# Patient Record
Sex: FEMALE | Race: BLACK OR AFRICAN AMERICAN | NOT HISPANIC OR LATINO | Employment: OTHER | URBAN - METROPOLITAN AREA
[De-identification: names, ages, dates, MRNs, and addresses within clinical notes are randomized per-mention and may not be internally consistent; named-entity substitution may affect disease eponyms.]

---

## 2017-09-04 ENCOUNTER — HOSPITAL ENCOUNTER (EMERGENCY)
Facility: OTHER | Age: 57
Discharge: HOME | End: 2017-09-04
Attending: EMERGENCY MEDICINE
Payer: MEDICAID

## 2017-09-04 VITALS
RESPIRATION RATE: 18 BRPM | OXYGEN SATURATION: 98 % | WEIGHT: 285 LBS | BODY MASS INDEX: 44.73 KG/M2 | DIASTOLIC BLOOD PRESSURE: 78 MMHG | TEMPERATURE: 98 F | HEART RATE: 80 BPM | HEIGHT: 67 IN | SYSTOLIC BLOOD PRESSURE: 134 MMHG

## 2017-09-04 DIAGNOSIS — M25.432 PAIN AND SWELLING OF LEFT WRIST: ICD-10-CM

## 2017-09-04 DIAGNOSIS — M25.532 PAIN AND SWELLING OF LEFT WRIST: ICD-10-CM

## 2017-09-04 DIAGNOSIS — R05.9 COUGH: Primary | ICD-10-CM

## 2017-09-04 PROCEDURE — 25000003 PHARM REV CODE 250: Performed by: EMERGENCY MEDICINE

## 2017-09-04 PROCEDURE — 99283 EMERGENCY DEPT VISIT LOW MDM: CPT

## 2017-09-04 RX ORDER — ACETAMINOPHEN 500 MG
1000 TABLET ORAL
Status: COMPLETED | OUTPATIENT
Start: 2017-09-04 | End: 2017-09-04

## 2017-09-04 RX ORDER — IBUPROFEN 400 MG/1
400 TABLET ORAL
Status: COMPLETED | OUTPATIENT
Start: 2017-09-04 | End: 2017-09-04

## 2017-09-04 RX ORDER — IBUPROFEN 400 MG/1
400 TABLET ORAL 3 TIMES DAILY PRN
Qty: 20 TABLET | Refills: 0 | Status: SHIPPED | OUTPATIENT
Start: 2017-09-04

## 2017-09-04 RX ORDER — BENZONATATE 100 MG/1
100 CAPSULE ORAL 3 TIMES DAILY PRN
Qty: 20 CAPSULE | Refills: 0 | Status: SHIPPED | OUTPATIENT
Start: 2017-09-04 | End: 2017-09-14

## 2017-09-04 RX ADMIN — ACETAMINOPHEN 1000 MG: 500 TABLET ORAL at 07:09

## 2017-09-04 RX ADMIN — IBUPROFEN 400 MG: 400 TABLET, FILM COATED ORAL at 08:09

## 2017-09-05 NOTE — ED PROVIDER NOTES
"Encounter Date: 9/4/2017    SCRIBE #1 NOTE: I, Mary Sorto , am scribing for, and in the presence of, Dr. Perez.       History     Chief Complaint   Patient presents with    Cough     Patient c/o a productive cough with green and yellow phlegm for 2 weeks with subjective fever and sweats.  Denies CP and SOB    Wrist Pain     Patietn c/o left wrist pain and swelling for several days.  Patient stated she had surgery on it 9 years ago.  Denies recent trauma.      Time seen by provider: 7:20 PM    This is a 56 y.o. female who presents with complaint of cough that began two days ago. Cough with "green and yellow" sputum is described as constant. Pt reports intermittent diaphoresis and left wrist pain with associated swelling (began two days ago), but denies fever, chills, nausea, vomiting, abdominal pain, SOB, myalgias, or numbness. Pt denies recent injury, and underwent surgery on the left wrist nine years ago.       The history is provided by the patient.     Review of patient's allergies indicates:   Allergen Reactions    Contrast media Other (See Comments)     " I am allergic to the dye because I go into convulsions."      Past Medical History:   Diagnosis Date    Arthritis     CHF (congestive heart failure)     COPD (chronic obstructive pulmonary disease)     Diabetes mellitus     Gout     HLD (hyperlipidemia)     Hypertension     Thyroid disease      Past Surgical History:   Procedure Laterality Date    HAND SURGERY      right hand    HYSTERECTOMY      TOTAL THYROIDECTOMY      TUBAL LIGATION       History reviewed. No pertinent family history.  Social History   Substance Use Topics    Smoking status: Never Smoker    Smokeless tobacco: Never Used    Alcohol use No     Review of Systems   Constitutional: Positive for diaphoresis. Negative for chills and fever.   HENT: Negative for sore throat.    Respiratory: Positive for cough. Negative for shortness of breath.    Cardiovascular: Negative for " chest pain.   Gastrointestinal: Negative for abdominal pain, nausea and vomiting.   Genitourinary: Negative for dysuria.   Musculoskeletal: Negative for back pain and myalgias.        Positive for pain to the left wrist with associated swelling.   Skin: Negative for rash.   Neurological: Negative for weakness and numbness.   Hematological: Does not bruise/bleed easily.       Physical Exam     Initial Vitals [09/04/17 1912]   BP Pulse Resp Temp SpO2   (!) 168/86 84 16 98.3 °F (36.8 °C) 96 %      MAP       113.33         Physical Exam    Nursing note and vitals reviewed.  Constitutional: She appears well-developed and well-nourished. She is not diaphoretic. No distress.   HENT:   Head: Normocephalic and atraumatic.   Right Ear: External ear normal.   Left Ear: External ear normal.   Eyes: Conjunctivae and EOM are normal. Pupils are equal, round, and reactive to light. Right eye exhibits no discharge. Left eye exhibits no discharge.   Neck: Normal range of motion. Neck supple. No tracheal deviation present.   Cardiovascular: Normal rate, regular rhythm, normal heart sounds and intact distal pulses. Exam reveals no gallop and no friction rub.    No murmur heard.  Pulmonary/Chest: Breath sounds normal. No stridor. No respiratory distress. She has no wheezes. She has no rhonchi. She has no rales.   Lungs are clear to auscultation bilaterally.    Abdominal: Soft. Bowel sounds are normal. She exhibits no distension. There is no tenderness. There is no rebound and no guarding.   Musculoskeletal: Normal range of motion. She exhibits tenderness. She exhibits no edema.   Left wrist: Ulnar bony tenderness. No edema, deformity, warmth, or erythema.   Neurological: She is alert and oriented to person, place, and time. She has normal strength. No cranial nerve deficit.   Skin: Skin is warm and dry. Capillary refill takes less than 2 seconds. No pallor.   Psychiatric: She has a normal mood and affect. Her behavior is normal.          ED Course   Procedures  Labs Reviewed - No data to display   Imaging Results          X-Ray Wrist Complete Left (Final result)  Result time 09/04/17 20:06:07    Final result by Cheryl Benson MD (09/04/17 20:06:07)                 Impression:        No acute displaced fracture or dislocation identified.      Electronically signed by: CHERYL BENSON MD, MD  Date:     09/04/17  Time:    20:06              Narrative:    COMPARISON: None    FINDINGS: 3 views left wrist.        Alignment is within normal limits. Single screw within the distal ulnar diaphysis, without evidence of failure or loosening.   No acute displaced fracture, dislocation, or destructive osseous process identified.  The joint spaces appear relatively maintained.   No subcutaneous emphysema or radiodense retained foreign body.                             X-Ray Chest PA And Lateral (Final result)  Result time 09/04/17 19:58:26    Final result by Cheryl Benson MD (09/04/17 19:58:26)                 Impression:        No detrimental change or radiographic acute intrathoracic process seen.      Electronically signed by: CHERYL BENSON MD, MD  Date:     09/04/17  Time:    19:58              Narrative:    COMPARISON: Chest radiograph 6/8/15    FINDINGS: Two views of the chest. Large body habitus. No detrimental change.   Pulmonary vasculature and hilar regions are within normal limits. A few scattered linear opacities consistent with subsegmental scarring versus atelectasis. Otherwise, the bilateral lungs are well expanded and clear.  No pleural effusion or pneumothorax.  Heart does not appear enlarged. Grossly stable mediastinal contours allowing for AP projection and slight rotation.  Included osseous structures appear grossly stable without acute process seen.                                   X-Rays:   Independently Interpreted Readings:   Chest X-Ray: No effusion or opacities.    Other Readings:  Left wrist: Screw in place. No acute fracture.      Medical Decision Making:   Clinical Tests:   Radiological Study: Ordered and Reviewed  ED Management:  Well-appearing patient with productive cough normal vital signs.  Chest x-ray without pneumonia.  Likely bronchitis.  Prescribed Tessalon Perles.  Also complains of wrist pain from a previous surgery.  No new injuries.  X-ray demonstrates no displaced hardware, no fractures.  Discharge to follow-up with primary care and surgery.    I did have an extensive talk regarding signs to return for and need for follow up. Patient expressed understanding and will monitor symptoms closely and follow-up as needed.    JAYDE Perez M.D.  09/05/2017  4:01 AM              Scribe Attestation:   Scribe #1: I performed the above scribed service and the documentation accurately describes the services I performed. I attest to the accuracy of the note.    Attending Attestation:           Physician Attestation for Scribe:  Physician Attestation Statement for Scribe #1: I, Dr. Perez, reviewed documentation, as scribed by Mary Sorto  in my presence, and it is both accurate and complete.                 ED Course      Clinical Impression:     1. Cough    2. Pain and swelling of left wrist                                 Ike Perez MD  09/05/17 0401

## 2017-09-05 NOTE — ED TRIAGE NOTES
"Pt presents to the ED with c/o productive cough x 2 days and left wrist pain. Pt states when she coughs it is "green and yellow". Pt reports that she had surgery 8 years ago in left wrist and it is hurting her. She can not move it and is "dropping things". Denies any recent trauma to the area and denies use of prescribed or OTC meds. Pt has hx of DM, HTN, CHF and COPD.   "

## 2017-09-07 ENCOUNTER — OFFICE VISIT (OUTPATIENT)
Dept: INTERNAL MEDICINE | Facility: CLINIC | Age: 57
End: 2017-09-07
Attending: FAMILY MEDICINE
Payer: MEDICAID

## 2017-09-07 VITALS
DIASTOLIC BLOOD PRESSURE: 78 MMHG | WEIGHT: 287.94 LBS | SYSTOLIC BLOOD PRESSURE: 128 MMHG | OXYGEN SATURATION: 99 % | TEMPERATURE: 98 F | HEIGHT: 67 IN | BODY MASS INDEX: 45.19 KG/M2 | HEART RATE: 88 BPM

## 2017-09-07 DIAGNOSIS — I10 ESSENTIAL HYPERTENSION: ICD-10-CM

## 2017-09-07 DIAGNOSIS — E66.9 OBESITY, UNSPECIFIED OBESITY SEVERITY, UNSPECIFIED OBESITY TYPE: ICD-10-CM

## 2017-09-07 DIAGNOSIS — M25.532 WRIST PAIN, ACUTE, LEFT: ICD-10-CM

## 2017-09-07 DIAGNOSIS — I50.9 CONGESTIVE HEART FAILURE, UNSPECIFIED CONGESTIVE HEART FAILURE CHRONICITY, UNSPECIFIED CONGESTIVE HEART FAILURE TYPE: ICD-10-CM

## 2017-09-07 DIAGNOSIS — E03.9 HYPOTHYROIDISM, UNSPECIFIED TYPE: Primary | ICD-10-CM

## 2017-09-07 DIAGNOSIS — E11.9 TYPE 2 DIABETES MELLITUS WITHOUT COMPLICATION, WITHOUT LONG-TERM CURRENT USE OF INSULIN: ICD-10-CM

## 2017-09-07 DIAGNOSIS — M17.0 OSTEOARTHRITIS OF BOTH KNEES, UNSPECIFIED OSTEOARTHRITIS TYPE: ICD-10-CM

## 2017-09-07 PROCEDURE — 99213 OFFICE O/P EST LOW 20 MIN: CPT | Mod: PBBFAC,PO | Performed by: STUDENT IN AN ORGANIZED HEALTH CARE EDUCATION/TRAINING PROGRAM

## 2017-09-07 PROCEDURE — 3008F BODY MASS INDEX DOCD: CPT | Mod: ,,, | Performed by: STUDENT IN AN ORGANIZED HEALTH CARE EDUCATION/TRAINING PROGRAM

## 2017-09-07 PROCEDURE — 3078F DIAST BP <80 MM HG: CPT | Mod: ,,, | Performed by: STUDENT IN AN ORGANIZED HEALTH CARE EDUCATION/TRAINING PROGRAM

## 2017-09-07 PROCEDURE — 99203 OFFICE O/P NEW LOW 30 MIN: CPT | Mod: S$PBB,,, | Performed by: STUDENT IN AN ORGANIZED HEALTH CARE EDUCATION/TRAINING PROGRAM

## 2017-09-07 PROCEDURE — 3074F SYST BP LT 130 MM HG: CPT | Mod: ,,, | Performed by: STUDENT IN AN ORGANIZED HEALTH CARE EDUCATION/TRAINING PROGRAM

## 2017-09-07 PROCEDURE — 99999 PR PBB SHADOW E&M-EST. PATIENT-LVL III: CPT | Mod: PBBFAC,,, | Performed by: STUDENT IN AN ORGANIZED HEALTH CARE EDUCATION/TRAINING PROGRAM

## 2017-09-07 PROCEDURE — 4010F ACE/ARB THERAPY RXD/TAKEN: CPT | Mod: ,,, | Performed by: STUDENT IN AN ORGANIZED HEALTH CARE EDUCATION/TRAINING PROGRAM

## 2017-09-07 RX ORDER — METHOCARBAMOL 500 MG/1
500 TABLET, FILM COATED ORAL 3 TIMES DAILY
COMMUNITY

## 2017-09-07 RX ORDER — LIDOCAINE AND PRILOCAINE 25; 25 MG/G; MG/G
CREAM TOPICAL
Qty: 30 G | Refills: 1 | Status: SHIPPED | OUTPATIENT
Start: 2017-09-07

## 2017-09-07 RX ORDER — LEVOTHYROXINE SODIUM 137 UG/1
137 TABLET ORAL
Qty: 30 TABLET | Refills: 2 | Status: SHIPPED | OUTPATIENT
Start: 2017-09-07 | End: 2020-06-23

## 2017-09-07 RX ORDER — GABAPENTIN 300 MG/1
300 CAPSULE ORAL 3 TIMES DAILY
COMMUNITY

## 2017-09-07 RX ORDER — LOSARTAN POTASSIUM 50 MG/1
50 TABLET ORAL DAILY
COMMUNITY
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
## 2017-09-20 NOTE — PROGRESS NOTES
Patient seen and examined with resident. Stable with current medication for chronic medical conditions. Advise supportive care for URI. Call with any worsening. Discussed dietary changes to assist with weight loss.

## 2018-03-22 ENCOUNTER — HOSPITAL ENCOUNTER (EMERGENCY)
Facility: OTHER | Age: 58
Discharge: HOME | End: 2018-03-23
Attending: EMERGENCY MEDICINE
Payer: MEDICAID

## 2018-03-22 VITALS
BODY MASS INDEX: 42.44 KG/M2 | SYSTOLIC BLOOD PRESSURE: 177 MMHG | TEMPERATURE: 99 F | DIASTOLIC BLOOD PRESSURE: 85 MMHG | OXYGEN SATURATION: 99 % | HEIGHT: 68 IN | RESPIRATION RATE: 18 BRPM | HEART RATE: 67 BPM | WEIGHT: 280 LBS

## 2018-03-22 DIAGNOSIS — J34.89 SINUS PRESSURE: Primary | ICD-10-CM

## 2018-03-22 DIAGNOSIS — H92.01 OTALGIA, RIGHT: ICD-10-CM

## 2018-03-22 PROCEDURE — 99283 EMERGENCY DEPT VISIT LOW MDM: CPT

## 2018-03-22 RX ORDER — FLUTICASONE PROPIONATE 50 MCG
1 SPRAY, SUSPENSION (ML) NASAL 2 TIMES DAILY PRN
Qty: 15 G | Refills: 0 | Status: SHIPPED | OUTPATIENT
Start: 2018-03-22 | End: 2018-03-29

## 2018-03-23 NOTE — ED PROVIDER NOTES
"Encounter Date: 3/22/2018    SCRIBE #1 NOTE: I, Millie Michelle, am scribing for, and in the presence of, Dr. Valle.       History     Chief Complaint   Patient presents with    Otalgia     R ear pain x couple hours.      Time seen by provider: 11:42 PM    This is a 57 y.o. female who presents with complaint of right ear pain which started a few hours ago. Gradual onset of right ear pain is described as constant and like pressure. She reports no alleviating or exacerbating factors of pain. She denies any foreign object in the ear prior to onset of pain. In review of systems, patient also reports congestion, cough, and sinus pressure. She denies fever, chills, myalgias, hearing loss, facial swelling, eye redness, or eye itching. She also denies having a history of frequent ear infections. The patient has no additional complaints at this time.      The history is provided by the patient.     Review of patient's allergies indicates:   Allergen Reactions    Contrast media Other (See Comments)     " I am allergic to the dye because I go into convulsions."      Past Medical History:   Diagnosis Date    Arthritis     CHF (congestive heart failure)     COPD (chronic obstructive pulmonary disease)     Diabetes mellitus     Gout     HLD (hyperlipidemia)     Hypertension     Thyroid disease      Past Surgical History:   Procedure Laterality Date    HAND SURGERY      right hand    HYSTERECTOMY      TOTAL THYROIDECTOMY      TUBAL LIGATION       History reviewed. No pertinent family history.  Social History   Substance Use Topics    Smoking status: Never Smoker    Smokeless tobacco: Never Used    Alcohol use No     Review of Systems   Constitutional: Negative for chills and fever.   HENT: Positive for congestion, ear pain and sinus pressure. Negative for facial swelling, hearing loss and sore throat.    Eyes: Negative for redness and itching.   Respiratory: Positive for cough. Negative for shortness of breath.  "   Cardiovascular: Negative for chest pain.   Gastrointestinal: Negative for abdominal pain, constipation, diarrhea, nausea and vomiting.   Genitourinary: Negative for dysuria.   Musculoskeletal: Negative for myalgias.   Skin: Negative for rash.   Neurological: Negative for weakness.   Hematological: Does not bruise/bleed easily.       Physical Exam     Initial Vitals [03/22/18 2316]   BP Pulse Resp Temp SpO2   (!) 177/85 67 18 98.6 °F (37 °C) 99 %      MAP       115.67         Physical Exam    Nursing note and vitals reviewed.  Constitutional: She appears well-developed and well-nourished. She is not diaphoretic. No distress.   HENT:   Head: Normocephalic and atraumatic.   Right Ear: External ear normal.   Left Ear: External ear normal.   Mouth/Throat: Oropharynx is clear and moist. No oropharyngeal exudate.   Eyes: EOM are normal. Pupils are equal, round, and reactive to light. Right eye exhibits no discharge. Left eye exhibits no discharge.   Neck: Normal range of motion. Neck supple.   Cardiovascular: Normal rate, regular rhythm and normal heart sounds. Exam reveals no gallop and no friction rub.    No murmur heard.  Pulmonary/Chest: Breath sounds normal. No respiratory distress. She has no wheezes. She has no rhonchi. She has no rales.   Musculoskeletal: She exhibits tenderness.   Tenderness to palpation over the right maxillary sinus.   Neurological: She is alert and oriented to person, place, and time.   Skin: Skin is warm and dry. Capillary refill takes less than 2 seconds. No rash and no abscess noted. No erythema. No pallor.   Psychiatric: She has a normal mood and affect. Her behavior is normal. Judgment and thought content normal.         ED Course   Procedures  Labs Reviewed - No data to display          Medical Decision Making:   Initial Assessment:   58 y/o female with right ear pain.  No evidence of infection/trauma/FB on exam.  She does have right maxillary sinus TTP.    ED Management:  Pt given Rx  for flonase and instructed to [facility] OT.  PCP f/u for re-evaluation.            Scribe Attestation:   Scribe #1: I performed the above scribed service and the documentation accurately describes the services I performed. I attest to the accuracy of the note.    Attending Attestation:           Physician Attestation for Scribe:  Physician Attestation Statement for Scribe #1: I, Dr. Valle, reviewed documentation, as scribed by Millie Michelle in my presence, and it is both accurate and complete.                    Clinical Impression:     1. Sinus pressure    2. Otalgia, right                                 Sue Valle MD  03/23/18 0052

## 2018-04-04 ENCOUNTER — HOSPITAL ENCOUNTER (EMERGENCY)
Facility: OTHER | Age: 58
Discharge: HOME | End: 2018-04-04
Attending: EMERGENCY MEDICINE
Payer: MEDICAID

## 2018-04-04 VITALS
RESPIRATION RATE: 18 BRPM | HEIGHT: 67 IN | DIASTOLIC BLOOD PRESSURE: 70 MMHG | TEMPERATURE: 98 F | SYSTOLIC BLOOD PRESSURE: 138 MMHG | HEART RATE: 85 BPM | BODY MASS INDEX: 45.15 KG/M2 | WEIGHT: 287.69 LBS | OXYGEN SATURATION: 96 %

## 2018-04-04 DIAGNOSIS — M79.605 LEFT LEG PAIN: ICD-10-CM

## 2018-04-04 PROCEDURE — 25000003 PHARM REV CODE 250: Performed by: EMERGENCY MEDICINE

## 2018-04-04 PROCEDURE — 99284 EMERGENCY DEPT VISIT MOD MDM: CPT

## 2018-04-04 RX ORDER — NEOMYCIN SULFATE, POLYMYXIN B SULFATE, HYDROCORTISONE 3.5; 10000; 1 MG/ML; [USP'U]/ML; MG/ML
4 SOLUTION/ DROPS AURICULAR (OTIC)
Status: COMPLETED | OUTPATIENT
Start: 2018-04-04 | End: 2018-04-04

## 2018-04-04 RX ADMIN — NEOMYCIN SULFATE, POLYMYXIN B SULFATE, HYDROCORTISONE 4 DROP: 3.5; 10000; 1 SOLUTION/ DROPS AURICULAR (OTIC) at 12:04

## 2018-04-04 NOTE — ED NOTES
Pt c/o pain behind the L knee x 4 days, also R sided face/neck pain w/ R earache and sore throat. Pt is A & O x 3, denies SOB and skin is warm and dry w/ pink mucosa.

## 2018-04-04 NOTE — ED PROVIDER NOTES
"Encounter Date: 4/4/2018    SCRIBE #1 NOTE: I, Angela Wellington, am scribing for, and in the presence of, Dr. Perez.       History     Chief Complaint   Patient presents with    Leg Pain     behind the L knee x 4 days and getting worse, also c/o sore throat and R sided face pain/ R ear pain     Time seen by provider: 12:28 AM    This is a 57 y.o. female, with history of arthritis, who presents with complaint of left leg pain for four days. The pain is located behind the knee and is described as "knots." She has an appointment with her PCP, Dr. Hatch, on 4/9. She also reports right ear pain and right sided neck pain. She denies fever, chills, hearing loss, ear drainage, SOB, chest pain, leg swelling, back pain, difficulty ambulating, or headache.      The history is provided by the patient.     Review of patient's allergies indicates:   Allergen Reactions    Contrast media Other (See Comments)     " I am allergic to the dye because I go into convulsions."      Past Medical History:   Diagnosis Date    Arthritis     CHF (congestive heart failure)     COPD (chronic obstructive pulmonary disease)     Diabetes mellitus     Gout     HLD (hyperlipidemia)     Hypertension     Thyroid disease      Past Surgical History:   Procedure Laterality Date    HAND SURGERY      right hand    HYSTERECTOMY      TOTAL THYROIDECTOMY      TUBAL LIGATION       History reviewed. No pertinent family history.  Social History   Substance Use Topics    Smoking status: Never Smoker    Smokeless tobacco: Never Used    Alcohol use No     Review of Systems   Constitutional: Negative for chills and fever.   HENT: Positive for ear pain. Negative for ear discharge, hearing loss and sore throat.    Respiratory: Negative for shortness of breath.    Cardiovascular: Negative for chest pain and leg swelling.   Gastrointestinal: Negative for nausea and vomiting.   Genitourinary: Negative for dysuria.   Musculoskeletal: Positive for neck pain. " Negative for back pain, gait problem, joint swelling and myalgias.        Positive for leg pain.   Skin: Negative for rash.   Neurological: Negative for weakness and headaches.   Hematological: Does not bruise/bleed easily.       Physical Exam     Initial Vitals [04/04/18 0022]   BP Pulse Resp Temp SpO2   (!) 158/77 82 20 98.4 °F (36.9 °C) (!) 94 %      MAP       104         Physical Exam    Nursing note and vitals reviewed.  Constitutional: She appears well-developed and well-nourished. She is not diaphoretic. No distress.   Morbidly obese.   HENT:   Head: Normocephalic and atraumatic.   Right Ear: Tympanic membrane is not erythematous. No middle ear effusion.   Left Ear: Tympanic membrane and ear canal normal.   Right ear canal: Erythematous. Not boggy. No discharge. No significant edema.  Mouth: Mostly edentulous. No gum swelling. No sublingual elevation. No tonsillar erythema, edema, or exudates.   Eyes: Conjunctivae and EOM are normal. No scleral icterus.   Neck: Normal range of motion. Neck supple.   No anterior neck masses.   Cardiovascular: Normal rate, regular rhythm and normal heart sounds. Exam reveals no gallop and no friction rub.    No murmur heard.  Difficult to palpate distal pulses secondary to obesity.   Pulmonary/Chest: Breath sounds normal. No respiratory distress. She has no wheezes. She has no rhonchi. She has no rales.   Musculoskeletal: Normal range of motion.   Posterior left thigh tenderness with no abnormalities. No significant edema.   Neurological: She is alert and oriented to person, place, and time.   Skin: Skin is warm and dry. No rash noted.   No induration or fluctuance of left thigh.         ED Course   Procedures  Labs Reviewed - No data to display   Imaging Results    None               Medical Decision Making:   Clinical Tests:   Radiological Study: Ordered and Reviewed  ED Management:  Morbidly obese patient presents complaining of right ear pain and left leg pain.  Right ear  has slightly erythematous canal, normal tympanic membranes.  I will treat for otitis externa, but is not entirely convincing exam.  Left leg exam is difficult due to her morbid obesity.  She does have history of arthritis and I think this most likely etiology, but she reports the pain is worse in the back and this is different than her usual arthritic pain.  She is very concerned about blood clots.  I find no signs of infection.  Ultrasound also finds no sign of blood clot.  Discharge follow-up closely primary care physical therapy and return here if worsening.    I did have an extensive talk regarding signs to return for and need for follow up. Patient expressed understanding and will monitor symptoms closely and follow-up as needed.    JAYDE Perez M.D.  04/04/2018  2:56 AM              Scribe Attestation:   Scribe #1: I performed the above scribed service and the documentation accurately describes the services I performed. I attest to the accuracy of the note.    Attending Attestation:           Physician Attestation for Scribe:  Physician Attestation Statement for Scribe #1: I, Dr. Perez, reviewed documentation, as scribed by Angela Wellington in my presence, and it is both accurate and complete.                    Clinical Impression:     1. Left leg pain                               Ike Perez MD  04/04/18 0256

## 2018-04-30 ENCOUNTER — TELEPHONE (OUTPATIENT)
Dept: ADMINISTRATIVE | Facility: HOSPITAL | Age: 58
End: 2018-04-30

## 2018-04-30 DIAGNOSIS — Z12.11 SCREENING FOR COLORECTAL CANCER: ICD-10-CM

## 2018-04-30 DIAGNOSIS — Z12.39 SCREENING FOR BREAST CANCER: ICD-10-CM

## 2018-04-30 DIAGNOSIS — Z13.5 DIABETIC RETINOPATHY SCREENING: Primary | ICD-10-CM

## 2018-04-30 DIAGNOSIS — Z12.12 SCREENING FOR COLORECTAL CANCER: ICD-10-CM

## 2018-05-01 ENCOUNTER — OFFICE VISIT (OUTPATIENT)
Dept: SLEEP MEDICINE | Facility: CLINIC | Age: 58
End: 2018-05-01
Payer: MEDICAID

## 2018-05-01 VITALS
HEIGHT: 67 IN | SYSTOLIC BLOOD PRESSURE: 110 MMHG | HEART RATE: 74 BPM | BODY MASS INDEX: 44.63 KG/M2 | DIASTOLIC BLOOD PRESSURE: 70 MMHG | WEIGHT: 284.38 LBS

## 2018-05-01 DIAGNOSIS — E11.9 TYPE 2 DIABETES MELLITUS WITHOUT COMPLICATION, WITHOUT LONG-TERM CURRENT USE OF INSULIN: ICD-10-CM

## 2018-05-01 DIAGNOSIS — G47.33 OBSTRUCTIVE SLEEP APNEA: Primary | ICD-10-CM

## 2018-05-01 DIAGNOSIS — E66.01 MORBID OBESITY WITH BMI OF 40.0-44.9, ADULT: ICD-10-CM

## 2018-05-01 DIAGNOSIS — I10 ESSENTIAL HYPERTENSION: ICD-10-CM

## 2018-05-01 PROCEDURE — 99204 OFFICE O/P NEW MOD 45 MIN: CPT | Mod: S$PBB,,, | Performed by: NURSE PRACTITIONER

## 2018-05-01 PROCEDURE — 99999 PR PBB SHADOW E&M-EST. PATIENT-LVL IV: CPT | Mod: PBBFAC,,, | Performed by: NURSE PRACTITIONER

## 2018-05-01 PROCEDURE — 99214 OFFICE O/P EST MOD 30 MIN: CPT | Mod: PBBFAC | Performed by: NURSE PRACTITIONER

## 2018-05-01 RX ORDER — FLUTICASONE PROPIONATE 50 MCG
1 SPRAY, SUSPENSION (ML) NASAL DAILY
COMMUNITY

## 2018-05-01 NOTE — PROGRESS NOTES
Anabella Dougherty was seen as a new patient, referred by Dr Sofia Uriostegui,  for the mgt of obstructive sleep apnea.     CHIEF COMPLAINT: CPAP monitoring    HISTORY OF PRESENT ILLNESS:Anabella Dougherty a 57 y.o.  male presents for the mgt of obstructive sleep apnea. She was diagnosed with ROSE MARY by study done [place] 2011. She has been adherent with cpap 10cm since this time. Having breakthrough snoring with her FFM intact. Here to establish new sleep clinic care, since [facility] closed.  Wants new machine. Has not had new supplies in over a year. +daytime sleepiness. Needs someone to get her checked ou. Wears mask nightly, if not getting low O2.     Denies symptoms of restless legs or kicking during sleep.   Interrogation- avg 6.1h/n. Quattro mirage mask. 19/30d>4h. Machine good condition    On todays Malcom Sleepiness Scale the patient scores a 23/24.     Sleep Clinic New Patient 5/1/2018   What time do you go to bed on a week day? (Give a range) 7:30 pm   What time do you go to bed on a day off? (Give a range) 7:30 pm   How long does it take you to fall asleep? (Give a range) 10 minutes   How many times per night do you wake up?  4   How long does it take you to fall back into sleep? (Give a range) 1 hour   What time do you wake up to start your day on a week day? (Give a range) 5:30 to 6:30 am   What time do you wake up to start your day on a day off? (Give a range) 5:30 to 6:30 am   What time do you get out of bed? (Give a range) 5:30 to 6:30 am   How many hours do you sleep?  4   How many naps do you take per day? 0   Rate your sleep quality from 0 to 5 (0-poor, 5-great). 3   Have you experienced:  N/a   How much weight have you gained or lost in pounds (lbs)?  0   How many times per night do you go to the bathroom?  4   Have you ever had a sleep study/CPAP machine/surgery for sleep apnea? Yes   Have you ever had a CPAP machine for sleep apnea? Yes   Have you ever had surgery for sleep apnea? No       FAMILY HISTORY: No  "known sleep disorders.     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 No ETOH, med disabled    REVIEW OF SYSTEMS:  Sleep related symptoms as per HPI; +overweight  Sleep Clinic ROS  5/1/2018   Difficulty breathing through the nose?  Yes   Sore throat or dry mouth in the morning? Yes   Irregular or very fast heart beat?  Yes   Shortness of breath?  Yes   Acid reflux? Yes   Body aches and pains?  Yes   Morning headaches? Yes   Dizziness? Yes   Mood changes?  Yes   Do you exercise?  Sometimes   Do you feel like moving your legs a lot?  No           PHYSICAL EXAM:   /70   Pulse 74   Ht 5' 7" (1.702 m)   Wt 129 kg (284 lb 6.3 oz)   LMP 02/10/2014   BMI 44.54 kg/m²   GENERAL: morbid obese body habitus, well groomed   HEENT: Conjunctivae are non-erythematous; Pupils equal, round, and reactive to light; Nose is symmetrical  SKIN: On face and neck: No abrasions, no rashes, no lesions. No subcutaneous nodules are palpable.   RESPIRATORY: Chest is clear to auscultation. Normal chest expansion and non-labored breathing at rest.   CARDIOVASCULAR: Normal S1, S2. No murmurs, gallops or rubs. No carotid bruits bilaterally.   EXTREMITIES: No edema. No clubbing. No cyanosis. Station normal. Gait normal.   NEURO/PSYCH: Oriented to time, place and person. Normal attention span and concentration. Affect is full. Mood is normal. Full fund of knowledge.       Sees outside PCP re: DM mgt    ASSESSMENT:     ROSE MARY, diagnosed 2011. Needs new supplies and machine. Having return of symptoms. No outside study available today for review.   She has medical comorbidities of morbid obesity, hypertension, DM, hypothyroidism. Warrants continued treatment.     PLAN:   1. Polysomnogram , discussed plan of care . Plan new machine after study resulted, in meantime get new alternative mask/supplies from [facility]   2. Discussed etiology of ROSE MARY and potential ramifications of untreated ROSE MARY, including stroke, heart disease, HTN.    3. The patient was advised to " abstain from driving should she feel sleepy or drowsy.   4. Encouraged weight loss efforts for potential improvement of ROSE MARY and overall health benefits    Thank you for allowing me the opportunity to participate in the care of your patient

## 2018-05-01 NOTE — LETTER
May 1, 2018      India Uriostegui MD  [address] [facility] [address]           [facility]  [address]  [facility] [address]  Phone: [phone]          Patient: Anabella Dougherty   MR Number: [id number]   YOB: 1960   Date of Visit: 5/1/2018       Dear Dr. India Uriostegui:    Thank you for referring Anabella Dougherty to me for evaluation. Attached you will find relevant portions of my assessment and plan of care.    If you have questions, please do not hesitate to call me. I look forward to following Anabella Dougherty along with you.    Sincerely,    Mari Ortega, NP    Enclosure  CC:  No Recipients    If you would like to receive this communication electronically, please contact [email or web address] or [phone] to request more information on "[employer]" Link access.    For providers and/or their staff who would like to refer a patient to [facility], please contact us through our one-stop-shop provider referral line, [facility] Carlos Eduardo, at [phone].    If you feel you have received this communication in error or would no longer like to receive these types of communications, please e-mail [email or web address]

## 2018-05-01 NOTE — PATIENT INSTRUCTIONS
Obstructive Sleep Apnea  Obstructive sleep apnea is a condition that causes your air passages to become narrowed or blocked during sleep. As a result, breathing stops for short periods. Your body wakes up enough for breathing to begin again, though you don't remember it. The cycle of stopped breathing and brief awakenings can repeat dozens of times a night. This prevents the body from getting to the deeper stages of sleep that are needed for good rest and may cause your body's oxygen level to fall.  Signs of sleep apnea include loud snoring, noisy breathing, and gasping sounds during sleep. Daytime symptoms include waking up tired after a full night's sleep, waking up with headaches, feeling very sleepy or falling asleep during the day, and having problems with memory or concentration.  Risk factors for sleep apnea include:  · Being overweight  · Being a man, or a woman in menopause  · Smoking  · Using alcohol or sedating medicines  · Having enlarged structures in the nose or throat  Home care  Lifestyle changes that can help treat snoring and sleep apnea include the following:  · If you are overweight, lose weight. Talk to your healthcare provider about a weight-loss plan for you.  · Avoid alcohol for 3 to 4 hours before bedtime. Avoid sedating medications. Ask your healthcare provider about the medicines you take.  · If you smoke, talk to your healthcare provider about ways to quit.  · Sleep on your side. This can help prevent gravity from pulling relaxed throat tissues into your breathing passages.  · If you have allergies or sinus problems that block your nose, ask your healthcare provider for help.  Follow-up care  Follow up with your healthcare provider, or as advised. A diagnosis of sleep apnea is made with a sleep study. Your healthcare provider can tell you more about this test.  When to seek medical advice  Sleep apnea can make you more likely to have certain health problems. These include high blood  pressure, heart attack, stroke, and sexual dysfunction. If you have sleep apnea, talk to your healthcare provider about the best treatments for you.  Date Last Reviewed: 4/1/2017  © 4741-4013 [email or web address]. [address] [place] [address], [place] [address]. All rights reserved. This information is not intended as a substitute for professional medical care. Always follow your healthcare professional's instructions.      Emily or Jarret will contact you to schedule your sleep study. Their number is [phone] (ext 2). The [facility] is located on 7th floor of the [facility].    We will call you when the sleep study results are ready - if you have not heard from us by 2 weeks from the date of the study, please call [phone] (ext 1).    You are advised to abstain from driving should you feel sleepy or drowsy.

## 2018-05-01 NOTE — PROGRESS NOTES
Overall CPAP use: around 2011  Is CPAP helping?  nightly  Mask Style, Comfort, fit, chin strap:  ffm mirage quattro small  Pressure Tolerance:  ok  Humidification: Setting is at  3.5 increased to 4.5 pt is having some oral drying    CPAP Device interrogation last 30 days:   Machine type : resmed  Machine condition:  Dirty, old, dirty filter  Pressure setting and range:  cpap 10  Ramp 20/4        Total usage: 123.3 hours       Average daily usage 30 days:  6.1  Hours    Days > 4 Hours: 19 days    Manometer reading: 3.5 cm H20  Wants a new machine.  Needs new equipment, tubing, mask, filters  Pt was worried about bacteria with her machine, explained that she needs to clean machine, mask, tubing weekly and the filter monthly to avoid this.

## 2018-05-09 ENCOUNTER — TELEPHONE (OUTPATIENT)
Dept: SLEEP MEDICINE | Facility: OTHER | Age: 58
End: 2018-05-09

## 2018-05-10 ENCOUNTER — HOSPITAL ENCOUNTER (OUTPATIENT)
Dept: SLEEP MEDICINE | Facility: OTHER | Age: 58
Discharge: HOME | End: 2018-05-10
Attending: NURSE PRACTITIONER
Payer: MEDICAID

## 2018-05-10 DIAGNOSIS — G47.33 OBSTRUCTIVE SLEEP APNEA: ICD-10-CM

## 2018-05-10 PROCEDURE — 95810 POLYSOM 6/> YRS 4/> PARAM: CPT | Mod: 26,,, | Performed by: PSYCHIATRY & NEUROLOGY

## 2018-05-10 PROCEDURE — 95810 POLYSOM 6/> YRS 4/> PARAM: CPT

## 2018-05-11 DIAGNOSIS — E11.9 TYPE 2 DIABETES MELLITUS WITHOUT COMPLICATION: ICD-10-CM

## 2018-05-11 NOTE — PROGRESS NOTES
End of The night summary    Type of Study Performed on ([employer]) SCREEN    Patient education/cpap information prior to Study/Setup                                    EKG:  Appears to be-  NSR                Low Spo2 80%                                 Any Difficulties recording: NONE      Tech summary comments:    pt did not meet criteria for split on cpap, soft to moderate snoring observed, pt slept with bed  up 30%, most of pts events observed supine REM position, pt slept well no reports of discomfort,

## 2018-05-24 ENCOUNTER — TELEPHONE (OUTPATIENT)
Dept: SLEEP MEDICINE | Facility: CLINIC | Age: 58
End: 2018-05-24

## 2018-05-24 DIAGNOSIS — G47.33 OBSTRUCTIVE SLEEP APNEA: Primary | ICD-10-CM

## 2018-05-24 NOTE — TELEPHONE ENCOUNTER
----- Message from Mark Do sent at 5/24/2018 11:25 AM CDT -----  Contact: patient            Name of Who is Calling: Anabella      What is the request in detail: patient is requesting a call back in reference to if the cpap will come in a carrying case.      Can the clinic reply by [facility]: no      What Number to Call Back [facility]: [phone]

## 2018-08-14 PROBLEM — G89.29 CHRONIC PAIN: Status: ACTIVE | Noted: 2017-10-11

## 2018-08-14 PROBLEM — M10.9 GOUT: Status: ACTIVE | Noted: 2017-10-11

## 2018-08-14 PROBLEM — E66.9 OBESITY: Status: ACTIVE | Noted: 2017-10-11

## 2018-08-14 PROBLEM — G47.9 SLEEP DISORDER: Status: ACTIVE | Noted: 2017-10-11

## 2018-08-14 PROBLEM — R13.13 PHARYNGEAL DYSPHAGIA: Status: ACTIVE | Noted: 2017-10-11

## 2018-08-14 PROBLEM — K63.5 POLYP OF COLON: Status: ACTIVE | Noted: 2017-10-11

## 2018-08-14 PROBLEM — J45.909 ASTHMA: Status: ACTIVE | Noted: 2017-10-11

## 2018-08-14 PROBLEM — E89.0 POSTOPERATIVE HYPOTHYROIDISM: Status: ACTIVE | Noted: 2017-10-11

## 2018-08-14 PROBLEM — M19.90 OSTEOARTHRITIS: Status: ACTIVE | Noted: 2017-10-11

## 2018-08-15 ENCOUNTER — OFFICE VISIT (OUTPATIENT)
Dept: SLEEP MEDICINE | Facility: CLINIC | Age: 58
End: 2018-08-15
Payer: MEDICAID

## 2018-08-15 VITALS
HEIGHT: 67 IN | BODY MASS INDEX: 44.63 KG/M2 | DIASTOLIC BLOOD PRESSURE: 80 MMHG | WEIGHT: 284.38 LBS | SYSTOLIC BLOOD PRESSURE: 120 MMHG | HEART RATE: 84 BPM

## 2018-08-15 DIAGNOSIS — E66.01 MORBID OBESITY WITH BMI OF 40.0-44.9, ADULT: ICD-10-CM

## 2018-08-15 DIAGNOSIS — E11.9 TYPE 2 DIABETES MELLITUS WITHOUT COMPLICATION, WITHOUT LONG-TERM CURRENT USE OF INSULIN: ICD-10-CM

## 2018-08-15 DIAGNOSIS — G47.33 OBSTRUCTIVE SLEEP APNEA: Primary | ICD-10-CM

## 2018-08-15 PROCEDURE — 99214 OFFICE O/P EST MOD 30 MIN: CPT | Mod: S$PBB,,, | Performed by: NURSE PRACTITIONER

## 2018-08-15 PROCEDURE — 99999 PR PBB SHADOW E&M-EST. PATIENT-LVL IV: CPT | Mod: PBBFAC,,, | Performed by: NURSE PRACTITIONER

## 2018-08-15 PROCEDURE — 99214 OFFICE O/P EST MOD 30 MIN: CPT | Mod: PBBFAC | Performed by: NURSE PRACTITIONER

## 2019-04-16 ENCOUNTER — TELEPHONE (OUTPATIENT)
Dept: SLEEP MEDICINE | Facility: CLINIC | Age: 59
End: 2019-04-16

## 2019-05-21 ENCOUNTER — OFFICE VISIT (OUTPATIENT)
Dept: SLEEP MEDICINE | Facility: CLINIC | Age: 59
End: 2019-05-21
Payer: MEDICAID

## 2019-05-21 VITALS
BODY MASS INDEX: 43.81 KG/M2 | HEART RATE: 85 BPM | WEIGHT: 279.13 LBS | DIASTOLIC BLOOD PRESSURE: 82 MMHG | HEIGHT: 67 IN | SYSTOLIC BLOOD PRESSURE: 116 MMHG

## 2019-05-21 DIAGNOSIS — G47.33 OBSTRUCTIVE SLEEP APNEA: Primary | ICD-10-CM

## 2019-05-21 PROCEDURE — 99214 OFFICE O/P EST MOD 30 MIN: CPT | Mod: PBBFAC | Performed by: NURSE PRACTITIONER

## 2019-05-21 PROCEDURE — 99999 PR PBB SHADOW E&M-EST. PATIENT-LVL IV: ICD-10-PCS | Mod: PBBFAC,,, | Performed by: NURSE PRACTITIONER

## 2019-05-21 PROCEDURE — 99214 PR OFFICE/OUTPT VISIT, EST, LEVL IV, 30-39 MIN: ICD-10-PCS | Mod: S$PBB,,, | Performed by: NURSE PRACTITIONER

## 2019-05-21 PROCEDURE — 99214 OFFICE O/P EST MOD 30 MIN: CPT | Mod: S$PBB,,, | Performed by: NURSE PRACTITIONER

## 2019-05-21 PROCEDURE — 99999 PR PBB SHADOW E&M-EST. PATIENT-LVL IV: CPT | Mod: PBBFAC,,, | Performed by: NURSE PRACTITIONER

## 2019-05-21 NOTE — PROGRESS NOTES
"Anabella Dougherty 58 y.o. year-old female returns for management of obstructive sleep apnea and CPAP equipment check. Last seen in clinic by Jordan GOMEZ 08/15/2018. This is her initial visit with me.     Pt sleep complaints of snoring and daytime sleepiness resolved with PAP use. Reports lip drying with AirFit F20 FF mask. No nasal drying. Humidifier 2/5. Denies mask leaks. Denies rainout.  Denies pressure intolerance or air hunger. Denies congestion. Denies aerophagia.     Dreamstation APAP 10 - 20 cm, 24 days, > 4 hours: 76.7%, All Days Average Usage: 5 h 35 m, Predicted AHI: 1.7, 90%tile pressure: 11.8 cm    5/10/18: AHI was 20.2 with an oxygen celestino of 82.0%. The supine AHI was 25.0 and the REM AHI was 69.2    Review of Systems:   Sleep related symptoms as per HPI.  Otherwise, a balance of 10 systems reviewed is negative.    Physical Exam:   /82   Pulse 85   Ht 5' 7" (1.702 m)   Wt 126.6 kg (279 lb 1.6 oz)   LMP 02/10/2014   BMI 43.71 kg/m²   GENERAL: Well groomed    Assessment:     Obstructive sleep apnea, moderate by AHI criteria. The patient symptomatically has snoring and daytime sleepiness  which improves with CPAP use. The patient is adherent on CPAP and experiencing symptomatic benefit. Medical co-morbidities: DM2, HTN, HLD, and obesity.  This warrants continued treatment for ROSE MARY.     Plan:     Continue APAP therapy at 10-20 cm H2O. Live demo adjusting humidifier 3-5/5. RTC 12 months sooner if needed.     Education: During our discussion today, we talked about the etiology of obstructive sleep apnea as well as the potential ramifications of untreated sleep apnea, which could include daytime sleepiness, hypertension, heart disease and/or stroke. We discussed potential treatment options, which could include weight loss, body positioning, continuous positive airway pressure (CPAP), or referral for surgical consideration.     Behavior modification which includes losing weight, exercising, changing the " sleep position, abstaining from alcohol, and avoiding certain medications    Precautions: The patient was advised to abstain from driving should they feel sleepy or drowsy

## 2020-03-26 ENCOUNTER — TELEPHONE (OUTPATIENT)
Dept: SLEEP MEDICINE | Facility: CLINIC | Age: 60
End: 2020-03-26

## 2020-03-26 NOTE — TELEPHONE ENCOUNTER
----- Message from Jackie Rubin sent at 3/26/2020 11:28 AM CDT -----  Contact: GAVIN GUIDO [[id number]]  Name of Who is Calling:GAVIN GUIDO [[id number]]  What is the request in detail: Pt is calling to schedule Appointment  . Pt is requesting a to speak to clinical team.Please contact to further discuss and advise        Can the clinic reply by [facility]:      What Number to Call Back if not in [facility]:[phone]

## 2020-03-31 ENCOUNTER — TELEPHONE (OUTPATIENT)
Dept: SLEEP MEDICINE | Facility: CLINIC | Age: 60
End: 2020-03-31

## 2020-03-31 NOTE — TELEPHONE ENCOUNTER
Called pt to discuss switching 4/1/2020 apt with PATRICIA Ortega to a virtual visit. Pt did not want to do the virtual visit so she was rescheduled to 6/23/2020

## 2020-05-05 ENCOUNTER — LAB VISIT (OUTPATIENT)
Dept: PRIMARY CARE CLINIC | Facility: CLINIC | Age: 60
End: 2020-05-05
Payer: MEDICAID

## 2020-05-05 DIAGNOSIS — R05.9 COUGH: ICD-10-CM

## 2020-05-05 DIAGNOSIS — R05.8 COUGH DUE TO ACE INHIBITOR: Primary | ICD-10-CM

## 2020-05-05 DIAGNOSIS — T46.4X5A COUGH DUE TO ACE INHIBITOR: Primary | ICD-10-CM

## 2020-05-05 PROCEDURE — U0002 COVID-19 LAB TEST NON-CDC: HCPCS

## 2020-05-06 LAB — SARS-COV-2 RNA RESP QL NAA+PROBE: NOT DETECTED

## 2020-05-11 ENCOUNTER — TELEPHONE (OUTPATIENT)
Dept: SLEEP MEDICINE | Facility: CLINIC | Age: 60
End: 2020-05-11

## 2020-05-11 NOTE — TELEPHONE ENCOUNTER
Returned pt's call regarding cpap machine. I asked pt if she would like to schedule an appointment and she said yes.

## 2020-05-11 NOTE — TELEPHONE ENCOUNTER
----- Message from Alona Goodwin sent at 5/11/2020 11:25 AM CDT -----  Contact: GAVIN GUIDO [[id number]]  Name of Who is Calling: GAVIN GUIDO [[id number]]    What is the request in detail: Would like to speak with provider in regards to CPAP machine and see how she is doing. Please contact to further discuss and advise      Can the clinic reply by [facility]: no    What Number to Call Back [facility] not [facility]: [phone]

## 2020-06-23 ENCOUNTER — OFFICE VISIT (OUTPATIENT)
Dept: SLEEP MEDICINE | Facility: CLINIC | Age: 60
End: 2020-06-23
Payer: MEDICAID

## 2020-06-23 VITALS
DIASTOLIC BLOOD PRESSURE: 84 MMHG | HEART RATE: 79 BPM | SYSTOLIC BLOOD PRESSURE: 121 MMHG | BODY MASS INDEX: 45.33 KG/M2 | HEIGHT: 67 IN | WEIGHT: 288.81 LBS

## 2020-06-23 DIAGNOSIS — G47.33 OBSTRUCTIVE SLEEP APNEA: Primary | ICD-10-CM

## 2020-06-23 PROCEDURE — 99999 PR PBB SHADOW E&M-EST. PATIENT-LVL III: CPT | Mod: PBBFAC,,, | Performed by: NURSE PRACTITIONER

## 2020-06-23 PROCEDURE — 99213 OFFICE O/P EST LOW 20 MIN: CPT | Mod: PBBFAC | Performed by: NURSE PRACTITIONER

## 2020-06-23 PROCEDURE — 99999 PR PBB SHADOW E&M-EST. PATIENT-LVL III: ICD-10-PCS | Mod: PBBFAC,,, | Performed by: NURSE PRACTITIONER

## 2020-06-23 PROCEDURE — 99213 OFFICE O/P EST LOW 20 MIN: CPT | Mod: S$PBB,,, | Performed by: NURSE PRACTITIONER

## 2020-06-23 PROCEDURE — 99213 PR OFFICE/OUTPT VISIT, EST, LEVL III, 20-29 MIN: ICD-10-PCS | Mod: S$PBB,,, | Performed by: NURSE PRACTITIONER

## 2020-06-23 RX ORDER — FLUOXETINE HYDROCHLORIDE 20 MG/1
CAPSULE ORAL
COMMUNITY
Start: 2019-02-28

## 2020-06-23 NOTE — PROGRESS NOTES
"Anabella Dougherty was seen as f/u today for the mgt of obstructive sleep apnea.       She continues to use apap machine 10-20cm nightly. Snoring resolved. Sleepy during day only when sedentary, she stays busy during daytime. using FFM F20 but cumbersome,w ants new tupe. Sleep is consolidated  Interrogation- avg6.4h/n. F20 mask. 100 %mask fit. 90% tile 11.5-13.58cm. 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 med disabled    REVIEW OF SYSTEMS:  Sleep related symptoms as per HPI  Difficulty breathing through the nose?  Yes   Sore throat or dry mouth in the morning? Yes   Irregular or very fast heart beat?  Yes   Shortness of breath?  Yes   Acid reflux? Yes   Body aches and pains?  Yes--left knee bone on bone   Morning headaches? Yes   Dizziness? Yes   Mood changes?  Yes   Do you exercise?  Sometimes   Do you feel like moving your legs a lot?  No       PHYSICAL EXAM:   /84   Pulse 79   Ht 5' 7" (1.702 m)   Wt 131 kg (288 lb 12.8 oz)   LMP 02/10/2014   BMI 45.23 kg/m²   GENERAL: morbid obese body habitus, well groomed     PS/10/18: AHI was 20.2 with an oxygen celestino of 82.0%. The supine AHI was 25.0 and the REM AHI was 69.2    Sees outside PCP re: DM mgt    ASSESSMENT:   ROSE MARY. Continued Excellent adherence, benefiting from therapy. AHI<5  She has medical comorbidities of morbid obesity, hypertension, DM2 w/o insulin or complication, hypothyroidism. Warrants continued treatment.     PLAN:   1. [facility] D[facility] for supplies. Get alternative style mask. Continue apap 10-20cm.    2. Discussed etiology of ROSE MARY and effectiveness of her therapy and potential ramifications of untreated ROSE MARY, including stroke, heart disease, HTN.    3.  RTC annually      "

## 2021-01-06 ENCOUNTER — TELEPHONE (OUTPATIENT)
Dept: SLEEP MEDICINE | Facility: CLINIC | Age: 61
End: 2021-01-06

## 2021-03-15 ENCOUNTER — TELEPHONE (OUTPATIENT)
Dept: SLEEP MEDICINE | Facility: CLINIC | Age: 61
End: 2021-03-15

## 2022-04-25 NOTE — PATIENT INSTRUCTIONS
Recommended replacement schedule for CPAP and BPAP supplies:    1) CPAP Mask, change every 3 months. Notes: clean at least once per week    2) CPAP Headgear, change every 3 - 6 months. Notes: Clean as needed, when dirty    3) CPAP tubing, change every 3 months. Notes: Clean at least once per week     4) Disposable filter, 2 new filters every month. Notes: Change at least once a month or when dirty    5) Non-disposable filter, change every 6 months. Notes: clean at least once per week     6) CPAP chinstrap, change every 6 months. Notes: Clean as needed, when dirty     7) Full Face Mask, change every 3 months. Notes: Clean at least once per week    8) Nasal CPAP mask, change every 3 months. Notes: Clean at least once per week     9) Humidifier Chamber, change every 6 months. Notes: Use distilled water; change water every 1 to 2 days and clean once per week     10) CPAP machine, new machine generally every 5 years. Notes: frequency depends on insurance     11) BPAP machine, new machine generally every 5 years. Notes: frequency depends on insurance     Note: Clean all supplies at least once a week with warm soapy water and/or water and vinegar (3:1 ratio).     *To order supplies please call your designated durable medical equipment (DME) or also referred to as home medical equipment (HME) company.     *CPAP/BPAP supplies prescription expires after one year from date it was written       Sleep Clinic: where you discuss snoring, sleep disruption, fatigue, restless legs or sleep behaviors with a sleep specialist; review sleep study results; formulate treatment plan   Sleep Lab: comprehensive in-lab sleep studies are monitored overnight in a private room; in-home sleep studies are monitored by a portable sleep study recording device  Durable Medical Equipment/Home Medical Equipment: specialized sleep equipment, contact your DME company for CPAP supplies, mask fitting, or questions about CPAP machines     No

## 2023-01-02 ENCOUNTER — HOSPITAL ENCOUNTER (EMERGENCY)
Facility: OTHER | Age: 63
Discharge: HOME | End: 2023-01-03
Attending: EMERGENCY MEDICINE
Payer: MEDICAID

## 2023-01-02 DIAGNOSIS — N76.0 ACUTE VAGINITIS: ICD-10-CM

## 2023-01-02 DIAGNOSIS — N89.8 VAGINAL ITCHING: Primary | ICD-10-CM

## 2023-01-02 LAB
BACTERIA #/AREA URNS HPF: NORMAL /HPF
BACTERIA GENITAL QL WET PREP: ABNORMAL
BILIRUB UR QL STRIP: NEGATIVE
CLARITY UR: CLEAR
CLUE CELLS VAG QL WET PREP: ABNORMAL
COLOR UR: YELLOW
FILAMENT FUNGI VAG WET PREP-#/AREA: ABNORMAL
GLUCOSE UR QL STRIP: ABNORMAL
HGB UR QL STRIP: ABNORMAL
KETONES UR QL STRIP: NEGATIVE
LEUKOCYTE ESTERASE UR QL STRIP: ABNORMAL
MICROSCOPIC COMMENT: NORMAL
NITRITE UR QL STRIP: NEGATIVE
PH UR STRIP: 6 [PH] (ref 5–8)
POCT GLUCOSE: 85 MG/DL (ref 70–110)
PROT UR QL STRIP: ABNORMAL
RBC #/AREA URNS HPF: 2 /HPF (ref 0–4)
SP GR UR STRIP: >1.03 (ref 1–1.03)
SPECIMEN SOURCE: ABNORMAL
SQUAMOUS #/AREA URNS HPF: 4 /HPF
T VAGINALIS GENITAL QL WET PREP: ABNORMAL
URN SPEC COLLECT METH UR: ABNORMAL
UROBILINOGEN UR STRIP-ACNC: ABNORMAL EU/DL
WBC #/AREA URNS HPF: 3 /HPF (ref 0–5)
WBC #/AREA VAG WET PREP: ABNORMAL
YEAST GENITAL QL WET PREP: ABNORMAL
YEAST URNS QL MICRO: NORMAL

## 2023-01-02 PROCEDURE — 87591 N.GONORRHOEAE DNA AMP PROB: CPT | Performed by: EMERGENCY MEDICINE

## 2023-01-02 PROCEDURE — 87491 CHLMYD TRACH DNA AMP PROBE: CPT | Performed by: EMERGENCY MEDICINE

## 2023-01-02 PROCEDURE — 82962 GLUCOSE BLOOD TEST: CPT

## 2023-01-02 PROCEDURE — 99284 EMERGENCY DEPT VISIT MOD MDM: CPT

## 2023-01-02 PROCEDURE — 87210 SMEAR WET MOUNT SALINE/INK: CPT | Performed by: EMERGENCY MEDICINE

## 2023-01-02 PROCEDURE — 81000 URINALYSIS NONAUTO W/SCOPE: CPT | Performed by: EMERGENCY MEDICINE

## 2023-01-02 RX ORDER — ALLOPURINOL 300 MG/1
300 TABLET ORAL DAILY
COMMUNITY
Start: 2022-11-28

## 2023-01-03 VITALS
HEIGHT: 67 IN | SYSTOLIC BLOOD PRESSURE: 130 MMHG | TEMPERATURE: 98 F | WEIGHT: 282 LBS | OXYGEN SATURATION: 96 % | RESPIRATION RATE: 18 BRPM | BODY MASS INDEX: 44.26 KG/M2 | DIASTOLIC BLOOD PRESSURE: 58 MMHG | HEART RATE: 83 BPM

## 2023-01-03 LAB
C TRACH DNA SPEC QL NAA+PROBE: NOT DETECTED
N GONORRHOEA DNA SPEC QL NAA+PROBE: NOT DETECTED

## 2023-01-03 RX ORDER — ASPIRIN 325 MG
1 TABLET, DELAYED RELEASE (ENTERIC COATED) ORAL NIGHTLY
Qty: 7 EACH | Refills: 0 | Status: SHIPPED | OUTPATIENT
Start: 2023-01-03

## 2023-01-03 NOTE — DISCHARGE INSTRUCTIONS
We have provided you with creams for your symptoms. Please fill and take as directed.    Please return to the ER if you have chest pain, difficulty breathing, fevers, altered mental status, dizziness, weakness, or any other concerns.      Follow up with your primary care physician.

## 2023-01-03 NOTE — ED PROVIDER NOTES
"Encounter Date: 1/2/2023    SCRIBE #1 NOTE: I, Cleopatra Angel, am scribing for, and in the presence of,  Mihaela Sorenson MD.     History     Chief Complaint   Patient presents with    Vaginal Itching     X 2 days     Time seen by provider: 9:50 PM    This is a 62 y.o. female with PMHx of HTN, HLD, CHF, COPD, and DM who presents with complaint of vaginal itching for the past two days. She also reports dysuria and has noticed blood when wiping after urination. She denies any vaginal discharge. Patient is not sexually active and no longer having menstrual periods. She states her most recent at home CBG was 85.    The history is provided by the patient.   Review of patient's allergies indicates:   Allergen Reactions    Iodine Anaphylaxis    Contrast media Other (See Comments)     " I am allergic to the dye because I go into convulsions."      Past Medical History:   Diagnosis Date    Arthritis     CHF (congestive heart failure)     COPD (chronic obstructive pulmonary disease)     Diabetes mellitus     Gout     HLD (hyperlipidemia)     Hypertension     Thyroid disease      Past Surgical History:   Procedure Laterality Date    HAND SURGERY      right hand    HYSTERECTOMY      TOTAL THYROIDECTOMY      TUBAL LIGATION       History reviewed. No pertinent family history.  Social History     Tobacco Use    Smoking status: Never    Smokeless tobacco: Never   Substance Use Topics    Alcohol use: No    Drug use: No     Review of Systems   Constitutional:  Negative for chills and fever.   Genitourinary:  Positive for dysuria. Negative for vaginal discharge.        Positive for vaginal itching.   Musculoskeletal:  Negative for back pain and neck pain.   Skin:  Negative for color change and rash.   Neurological:  Negative for dizziness and headaches.     Physical Exam     Initial Vitals [01/02/23 2121]   BP Pulse Resp Temp SpO2   (!) 167/108 96 18 98.3 °F (36.8 °C) 99 %      MAP       --         Physical Exam    Nursing note and " vitals reviewed.  Constitutional: She appears well-developed and well-nourished.   HENT:   Head: Normocephalic and atraumatic.   Eyes: Conjunctivae are normal.   Pulmonary/Chest: No respiratory distress.   Genitourinary:    Genitourinary Comments: Pelvic exam: Erythematous, irritated labia and vulva.  Dry mucosa.  No discharge.  No lesions     Musculoskeletal:         General: Normal range of motion.     Neurological: She is alert and oriented to person, place, and time.   Ambulatory with steady gait.   Skin: Skin is warm and dry. Capillary refill takes less than 2 seconds.       ED Course   Procedures  Labs Reviewed   VAGINAL SCREEN - Abnormal; Notable for the following components:       Result Value    WBC - Vaginal Screen Moderate (*)     Bacteria - Vaginal Screen Many (*)     All other components within normal limits    Narrative:     Release to patient->Immediate   URINALYSIS - Abnormal; Notable for the following components:    Specific Gravity, UA >1.030 (*)     Protein, UA Trace (*)     Glucose, UA 4+ (*)     Occult Blood UA 1+ (*)     Urobilinogen, UA 2.0-3.0 (*)     Leukocytes, UA 2+ (*)     All other components within normal limits   C. TRACHOMATIS/N. GONORRHOEAE BY AMP DNA   URINALYSIS MICROSCOPIC   POCT GLUCOSE   POCT GLUCOSE MONITORING CONTINUOUS          Imaging Results    None          Medications - No data to display  Medical Decision Making:   History:   Old Medical Records: I decided to obtain old medical records.  Old Records Summarized: other records and records from another hospital.  Initial Assessment:   9:50PM:  Pt is a 61 y/o F who presents to ED with vaginal itching x 2 days. Pt appears well, nontoxic.  On exam, she does have significant and erythema of her vagina and vulva.  She does have a diabetes which increases her risk for candida.  She also is post menopausal and likely has some vaginal atrophy as well.  Will plan for POC glucose, pelvic swabs, will continue to follow and reassess.     Clinical Tests:   Lab Tests: Ordered and Reviewed     12:29 AM:  Pt doing well, remains stable.  Her Vaginal screen was negative for any evidence of yeast. Her UA is negative for any evidence of infection.  Given her diabetes and vaginal irritation, will plan to treat for yeast as well as topical estrogen and have her f/u with her ob-gyn.  I do not feel that further work up in the ED is indicated at this time.  I updated pt regarding results and I counseled pt regarding supportive care measures.  I have discussed with the pt ED return warnings and need for close PCP f/u.  Pt agreeable to plan and all questions answered.  I feel that pt is stable for discharge and management as an outpatient and no further intervention is needed at this time.  Pt is comfortable returning to the ED if needed.  Will DC home in stable condition.         Scribe Attestation:   Scribe #1: I performed the above scribed service and the documentation accurately describes the services I performed. I attest to the accuracy of the note.            Physician Attestation for Scribe: I, Mihaela Sorenson, reviewed documentation as scribed in my presence, which is both accurate and complete.         Clinical Impression:   Final diagnoses:  [N89.8] Vaginal itching (Primary)  [N76.0] Acute vaginitis        ED Disposition Condition    Discharge Stable          ED Prescriptions       Medication Sig Dispense Start Date End Date Auth. Provider    conjugated estrogens (PREMARIN) vaginal cream Place 1 g vaginally once daily. for 14 days 1 applicator 1/3/2023 1/17/2023 Mihaela Sorenson MD    clotrimazole (LOTRIMIN) 1 % Crea Place 1 suppository (1 applicator total) vaginally nightly. 7 each 1/3/2023 -- Mihaela Sorenson MD          Follow-up Information       Follow up With Specialties Details Why Contact Info    Primary Care Physician                 Mihaela Sorenson MD  01/03/23 [id number]

## 2023-01-03 NOTE — ED NOTES
Adult Physical Assessment  LOC: Anabella Dougherty, 62 y.o. female verified via two identifiers.  The patient is awake, alert, oriented and speaking appropriately at this time.  APPEARANCE: Patient resting comfortably and appears to be in no acute distress at this time. Patient is clean and well groomed, patient's clothing is properly fastened.  SKIN:The skin is warm and dry, color consistent with ethnicity, patient has normal skin turgor and moist mucus membranes, skin intact, no breakdown or brusing noted.  MUSCULOSKELETAL: Patient moving all extremities well, no obvious swelling or deformities noted.  RESPIRATORY: Airway is open and patent, respirations are spontaneous, patient has a normal effort and rate, no accessory muscle use noted.  CARDIAC: Patient has a normal rate and rhythm, no periphreal edema noted in any extremity, capillary refill < 3 seconds in all extremities  ABDOMEN: Soft and non tender to palpation, no abdominal distention noted. Bowel sounds present in all four quadrants.  GENITOURINARY: C/o vaginal itching and irritation.  NEUROLOGIC: Eyes open spontaneously, behavior appropriate to situation, follows commands, facial expression symmetrical, bilateral hand grasp equal and even, purposeful motor response noted, normal sensation in all extremities when touched with a finger.

## 2023-01-03 NOTE — ED TRIAGE NOTES
The patient complains of vaginal itching and irritation x 2 days. She denies sexual activity. CBG 85.

## 2024-12-15 ENCOUNTER — HOSPITAL ENCOUNTER (EMERGENCY)
Facility: OTHER | Age: 64
Discharge: HOME | End: 2024-12-16
Attending: EMERGENCY MEDICINE
Payer: MEDICAID

## 2024-12-15 DIAGNOSIS — N76.0 ACUTE VAGINITIS: Primary | ICD-10-CM

## 2024-12-15 PROCEDURE — 99284 EMERGENCY DEPT VISIT MOD MDM: CPT

## 2024-12-16 VITALS
HEIGHT: 67 IN | BODY MASS INDEX: 43.32 KG/M2 | HEART RATE: 71 BPM | OXYGEN SATURATION: 100 % | RESPIRATION RATE: 18 BRPM | TEMPERATURE: 98 F | SYSTOLIC BLOOD PRESSURE: 140 MMHG | WEIGHT: 276 LBS | DIASTOLIC BLOOD PRESSURE: 61 MMHG

## 2024-12-16 LAB
BACTERIA GENITAL QL WET PREP: ABNORMAL
CLUE CELLS VAG QL WET PREP: ABNORMAL
FILAMENT FUNGI VAG WET PREP-#/AREA: ABNORMAL
SPECIMEN SOURCE: ABNORMAL
T VAGINALIS GENITAL QL WET PREP: ABNORMAL
WBC #/AREA VAG WET PREP: ABNORMAL
YEAST GENITAL QL WET PREP: ABNORMAL

## 2024-12-16 PROCEDURE — 87210 SMEAR WET MOUNT SALINE/INK: CPT | Performed by: EMERGENCY MEDICINE

## 2024-12-16 PROCEDURE — 25000003 PHARM REV CODE 250: Performed by: EMERGENCY MEDICINE

## 2024-12-16 PROCEDURE — 87491 CHLMYD TRACH DNA AMP PROBE: CPT | Performed by: EMERGENCY MEDICINE

## 2024-12-16 RX ORDER — FLUCONAZOLE 150 MG/1
150 TABLET ORAL
Status: COMPLETED | OUTPATIENT
Start: 2024-12-16 | End: 2024-12-16

## 2024-12-16 RX ADMIN — FLUCONAZOLE 150 MG: 150 TABLET ORAL at 12:12

## 2024-12-16 NOTE — ED PROVIDER NOTES
"Encounter Date: 12/15/2024       History     Chief Complaint   Patient presents with    Vaginal Discharge     Pt states she was at [facility] on 12/9 and was admitted and given ABX, pt states now she has vaginal discharge, that is burning and also states her buttock is burning      64-year-old female with history of heart failure, COPD, diabetes, hyperlipidemia and hypertension presents for evaluation anal and vaginal itching.  She states she feels the itching on the inside.  She also reports blood with wiping after a bowel movement.  She states she was seen at an outside emergency department diagnosed with pneumonia recently and prescribed azithromycin and amoxicillin.  She returned to the emergency department 3 days later with the same complaint she has tonight and was told to discontinue the antibiotics because they did not see signs of pneumonia and she was prescribed Boudreux's butt paste and fluconazole with the plan that she would take the 2nd dose today if her symptoms were still present.  She has been applying the butt paste but states that she never received the prescription for fluconazole.  The patient denies any new symptoms including fever, chills, new rash, myalgias, abdominal pain, nausea, and vomiting.  She states that she is here for a 2nd opinion.      Review of patient's allergies indicates:   Allergen Reactions    Iodine Anaphylaxis    Contrast media Other (See Comments)     " I am allergic to the dye because I go into convulsions."      Past Medical History:   Diagnosis Date    Arthritis     CHF (congestive heart failure)     COPD (chronic obstructive pulmonary disease)     Diabetes mellitus     Gout     HLD (hyperlipidemia)     Hypertension     Thyroid disease      Past Surgical History:   Procedure Laterality Date    HAND SURGERY      right hand    HYSTERECTOMY      TOTAL THYROIDECTOMY      TUBAL LIGATION       No family history on file.  Social History     Tobacco Use    Smoking status: Never    " Smokeless tobacco: Never   Substance Use Topics    Alcohol use: No    Drug use: No     Review of Systems    Physical Exam     Initial Vitals [12/15/24 2315]   BP Pulse Resp Temp SpO2   (!) 159/86 85 18 98 °F (36.7 °C) 96 %      MAP       --         Physical Exam    Vitals reviewed.  Constitutional: She appears well-developed and well-nourished. She is not diaphoretic. No distress.   Morbidly obese   HENT:   Head: Normocephalic and atraumatic.   Right Ear: External ear normal.   Left Ear: External ear normal.   Nose: Nose normal.   Eyes: Conjunctivae and EOM are normal. Right eye exhibits no discharge. Left eye exhibits no discharge.   Cardiovascular:  Normal rate, regular rhythm and normal heart sounds.     Exam reveals no gallop and no friction rub.       No murmur heard.  Pulmonary/Chest: Breath sounds normal. No respiratory distress. She has no wheezes. She has no rhonchi. She has no rales.   Abdominal: Abdomen is soft. She exhibits no distension. There is no abdominal tenderness. There is no rebound and no guarding.   Genitourinary:    Vagina normal.      Genitourinary Comments: Perineal skin intact without erythema, warmth, lesions and edema.  Trace amount of whitish colored vaginal discharge.  No lesions noted in the vaginal canal.     Musculoskeletal:         General: Normal range of motion.     Neurological: She is alert and oriented to person, place, and time.   Skin: Skin is warm. No rash noted. No erythema.         ED Course   Procedures  Labs Reviewed   VAGINAL SCREEN - Abnormal       Result Value    Trichomonas None      Clue Cells None      Budding Yeast None      Fungal Hyphae None      WBC - Vaginal Screen Rare (*)     Bacteria - Vaginal Screen Occasional (*)     Wet Prep Source Vagina      Narrative:     Release to patient->Immediate   C. TRACHOMATIS/N. GONORRHOEAE BY AMP DNA          Imaging Results    None          Medications   fluconazole tablet 150 mg (150 mg Oral Given 12/16/24 0059)      Medical Decision Making  64-year-old female presents for evaluation of perineal/gluteal itching and bright red blood with wiping after a bowel movement.  On exam she has no signs of hemorrhoids.  The skin in perineal region is extremely dry but it is intact without evidence of rash or lesions.  The patient does admit to taking very hot showers which is contributing to her skin dryness and itching.  Her pelvic exam was unremarkable.  Wet prep and GC were collected although low suspicion for either given her exam.  Wet prep showed bacteria but otherwise negative.  I did discuss with her giving her the 2nd dose of fluconazole which she did not get a prescription for prior to my exam, therefore, she was given this dose even though I do not suspect yeast infection based on her exam.  I did recommend that she use calamine lotion and take lukewarm showers to help relieve the itching and avoid the dryness.  She was instructed to follow up with her PCP for re-evaluation.    Amount and/or Complexity of Data Reviewed  Labs: ordered.    Risk  Prescription drug management.                                      Clinical Impression:  Final diagnoses:  [N76.0] Acute vaginitis (Primary)          ED Disposition Condition    Discharge Stable          ED Prescriptions    None       Follow-up Information    None          Sue Valle MD  12/16/24 0439

## 2024-12-16 NOTE — ED TRIAGE NOTES
"Anabella Dougherty, an 64 y.o. female presents to the ED complaining of vaginal itching, dysuria, and rectal itching.      Chief Complaint   Patient presents with    Vaginal Discharge     Pt states she was at [facility] on 12/9 and was admitted and given ABX, pt states now she has vaginal discharge, that is burning and also states her buttock is burning      Review of patient's allergies indicates:   Allergen Reactions    Iodine Anaphylaxis    Contrast media Other (See Comments)     " I am allergic to the dye because I go into convulsions."      Past Medical History:   Diagnosis Date    Arthritis     CHF (congestive heart failure)     COPD (chronic obstructive pulmonary disease)     Diabetes mellitus     Gout     HLD (hyperlipidemia)     Hypertension     Thyroid disease        "

## 2024-12-16 NOTE — DISCHARGE INSTRUCTIONS
Try Calamine lotion for itching and avoid taking hot showers as this will dry your skin further.    Follow up with your primary care for further evaluation.

## 2024-12-19 LAB
C TRACH DNA SPEC QL NAA+PROBE: NOT DETECTED
N GONORRHOEA DNA SPEC QL NAA+PROBE: NOT DETECTED